# Patient Record
Sex: MALE | Race: WHITE | NOT HISPANIC OR LATINO | Employment: FULL TIME | URBAN - METROPOLITAN AREA
[De-identification: names, ages, dates, MRNs, and addresses within clinical notes are randomized per-mention and may not be internally consistent; named-entity substitution may affect disease eponyms.]

---

## 2017-04-17 DIAGNOSIS — I10 ESSENTIAL HYPERTENSION: ICD-10-CM

## 2017-04-17 RX ORDER — AMLODIPINE BESYLATE AND BENAZEPRIL HYDROCHLORIDE 5; 10 MG/1; MG/1
1 CAPSULE ORAL DAILY
Qty: 30 CAPSULE | Refills: 0 | Status: SHIPPED | OUTPATIENT
Start: 2017-04-17 | End: 2017-07-21 | Stop reason: SDUPTHER

## 2017-06-21 ENCOUNTER — OFFICE VISIT (OUTPATIENT)
Dept: INTERNAL MEDICINE | Age: 32
End: 2017-06-21

## 2017-06-21 VITALS
HEART RATE: 72 BPM | WEIGHT: 298 LBS | HEIGHT: 69 IN | BODY MASS INDEX: 44.14 KG/M2 | DIASTOLIC BLOOD PRESSURE: 80 MMHG | RESPIRATION RATE: 12 BRPM | SYSTOLIC BLOOD PRESSURE: 130 MMHG

## 2017-06-21 DIAGNOSIS — I10 ESSENTIAL HYPERTENSION: Chronic | ICD-10-CM

## 2017-06-21 DIAGNOSIS — R21 RASH: Primary | ICD-10-CM

## 2017-06-21 PROCEDURE — 99214 OFFICE O/P EST MOD 30 MIN: CPT | Performed by: INTERNAL MEDICINE

## 2017-06-21 NOTE — PROGRESS NOTES
"  Rene Goodman is a 31 y.o. male who presents with   Chief Complaint   Patient presents with   • Rash     Groin/lower abdominal rash.  Recently went to a local [facility] clinic and was diagnosed with \"jock itch\".  Was given Lotrimin powder which did not help.  He tried Lotrimin spray topically which has helped and he says now the problem is \"about gone\"   • Hypertension     Check blood pressure   .    Rash   This is a new problem. The current episode started in the past 7 days. The problem has been rapidly improving since onset. The affected locations include the abdomen and groin. The rash is characterized by itchiness and redness. He was exposed to nothing. Treatments tried: Lotrimin spray. The treatment provided significant relief.   Hypertension   This is a chronic problem. The current episode started more than 1 year ago. The problem is controlled. Past treatments include calcium channel blockers and ACE inhibitors. The current treatment provides moderate improvement. There are no compliance problems.         The following portions of the patient's history were reviewed and updated as appropriate: allergies, current medications, past medical history and problem list.    Review of Systems   Constitutional: Negative.    HENT: Negative.    Eyes: Negative.    Respiratory: Negative.    Cardiovascular: Negative.    Genitourinary: Negative.    Musculoskeletal: Negative.    Skin: Positive for rash.   Neurological: Negative.    Psychiatric/Behavioral: Negative.        Objective   Physical Exam   Constitutional: He is oriented to person, place, and time. He appears well-developed and well-nourished. No distress.   HENT:   Head: Normocephalic and atraumatic.   Eyes: Conjunctivae and EOM are normal. Pupils are equal, round, and reactive to light.   Neck: Normal range of motion. Neck supple. No thyromegaly present.   Neck exam negative.  Carotid auscultation normal-no bruits heard.   Cardiovascular: Normal rate, regular " "rhythm, normal heart sounds and intact distal pulses.  Exam reveals no gallop and no friction rub.    No murmur heard.  Pulmonary/Chest: Effort normal and breath sounds normal. No respiratory distress. He has no wheezes. He has no rales. He exhibits no tenderness.   Neurological: He is alert and oriented to person, place, and time.   Skin:   On today's visit there is very little to be seen in the way of any rash.  He has a few red bumps superior to his umbilicus but other than that there is no specific-appearing rash that can be seen in his groin or lower abdomen and by his own admission \"the rash is about gone anyway\" and he is feeling significantly improved by using the Lotrimin spray.   Psychiatric: He has a normal mood and affect. His behavior is normal. Judgment and thought content normal.   Nursing note and vitals reviewed.      Assessment/Plan   Rene was seen today for rash and hypertension.    Diagnoses and all orders for this visit:    Rash    Essential hypertension      Plan: I have suggested that he continue using the Lotrimin spray for another week and then discontinue using it.    Hypertension: Blood pressure under good control with current treatment.  Continue the same.    His labs were reviewed from October and basically were within acceptable range.  He did not follow through with a six-month follow-up visit because he said there was some job change involved and he had to go out of town.  He was advised a follow-up checkup/lab update visit this coming October         "

## 2017-07-21 ENCOUNTER — TELEPHONE (OUTPATIENT)
Dept: INTERNAL MEDICINE | Age: 32
End: 2017-07-21

## 2017-07-21 DIAGNOSIS — I10 ESSENTIAL HYPERTENSION: ICD-10-CM

## 2017-07-21 RX ORDER — AMLODIPINE BESYLATE AND BENAZEPRIL HYDROCHLORIDE 5; 10 MG/1; MG/1
1 CAPSULE ORAL DAILY
Qty: 30 CAPSULE | Refills: 3 | Status: SHIPPED | OUTPATIENT
Start: 2017-07-21 | End: 2017-12-02 | Stop reason: SDUPTHER

## 2017-12-02 DIAGNOSIS — I10 ESSENTIAL HYPERTENSION: ICD-10-CM

## 2017-12-04 RX ORDER — AMLODIPINE BESYLATE AND BENAZEPRIL HYDROCHLORIDE 5; 10 MG/1; MG/1
1 CAPSULE ORAL DAILY
Qty: 30 CAPSULE | Refills: 0 | Status: SHIPPED | OUTPATIENT
Start: 2017-12-04 | End: 2018-09-10 | Stop reason: SDDI

## 2018-09-10 ENCOUNTER — OFFICE VISIT (OUTPATIENT)
Dept: INTERNAL MEDICINE | Age: 33
End: 2018-09-10

## 2018-09-10 VITALS
RESPIRATION RATE: 13 BRPM | WEIGHT: 310 LBS | SYSTOLIC BLOOD PRESSURE: 130 MMHG | TEMPERATURE: 96.6 F | HEART RATE: 105 BPM | DIASTOLIC BLOOD PRESSURE: 108 MMHG | OXYGEN SATURATION: 100 % | HEIGHT: 69 IN | BODY MASS INDEX: 45.91 KG/M2

## 2018-09-10 DIAGNOSIS — E78.2 MIXED HYPERLIPIDEMIA: ICD-10-CM

## 2018-09-10 DIAGNOSIS — E66.01 MORBID OBESITY WITH BODY MASS INDEX (BMI) OF 45.0 TO 49.9 IN ADULT (HCC): ICD-10-CM

## 2018-09-10 DIAGNOSIS — I10 ESSENTIAL HYPERTENSION: Chronic | ICD-10-CM

## 2018-09-10 DIAGNOSIS — Z00.00 ANNUAL PHYSICAL EXAM: Primary | ICD-10-CM

## 2018-09-10 LAB
ALBUMIN SERPL-MCNC: 5.3 G/DL (ref 3.5–5.2)
ALBUMIN/GLOB SERPL: 1.9 G/DL
ALP SERPL-CCNC: 94 U/L (ref 39–117)
ALT SERPL-CCNC: 53 U/L (ref 1–41)
AST SERPL-CCNC: 29 U/L (ref 1–40)
BILIRUB SERPL-MCNC: 0.7 MG/DL (ref 0.1–1.2)
BUN SERPL-MCNC: 11 MG/DL (ref 6–20)
BUN/CREAT SERPL: 10.4 (ref 7–25)
CALCIUM SERPL-MCNC: 10.5 MG/DL (ref 8.6–10.5)
CHLORIDE SERPL-SCNC: 101 MMOL/L (ref 98–107)
CHOLEST SERPL-MCNC: 197 MG/DL (ref 0–200)
CO2 SERPL-SCNC: 30.3 MMOL/L (ref 22–29)
CREAT SERPL-MCNC: 1.06 MG/DL (ref 0.76–1.27)
GLOBULIN SER CALC-MCNC: 2.8 GM/DL
GLUCOSE SERPL-MCNC: 93 MG/DL (ref 65–99)
HBA1C MFR BLD: 5.51 % (ref 4.8–5.6)
HDLC SERPL-MCNC: 42 MG/DL (ref 40–60)
LDLC SERPL CALC-MCNC: 136 MG/DL (ref 0–100)
POTASSIUM SERPL-SCNC: 4.1 MMOL/L (ref 3.5–5.2)
PROT SERPL-MCNC: 8.1 G/DL (ref 6–8.5)
SODIUM SERPL-SCNC: 146 MMOL/L (ref 136–145)
T4 FREE SERPL-MCNC: 1.39 NG/DL (ref 0.93–1.7)
TRIGL SERPL-MCNC: 94 MG/DL (ref 0–150)
TSH SERPL DL<=0.005 MIU/L-ACNC: 2.2 MIU/ML (ref 0.27–4.2)
VLDLC SERPL CALC-MCNC: 18.8 MG/DL (ref 5–40)

## 2018-09-10 PROCEDURE — 99395 PREV VISIT EST AGE 18-39: CPT | Performed by: INTERNAL MEDICINE

## 2018-09-10 RX ORDER — AMLODIPINE BESYLATE AND BENAZEPRIL HYDROCHLORIDE 5; 10 MG/1; MG/1
1 CAPSULE ORAL DAILY
Qty: 90 CAPSULE | Refills: 3 | Status: SHIPPED | OUTPATIENT
Start: 2018-09-10 | End: 2019-09-27 | Stop reason: SDUPTHER

## 2018-09-10 NOTE — PROGRESS NOTES
Rene Goodman is a 33 y.o. male who presents with   Chief Complaint   Patient presents with   • Annual Exam   • Hypertension     Patient has been on Lotrel 5-10 every morning but admits that he has not been on any of this since May.   • Hyperlipidemia     Currently on no lipid lowering therapy.   • Morbid obesity     Weight 310 pounds.  BMI 45.8   .    33-year-old morbidly obese male presents for a physical with lab updates.  History is as outlined above.  Also he notes some problems with snoring and fatigue in the mornings although he says he only gets 4 or 5 hours of sleep at night.      Hypertension   This is a chronic problem. The current episode started more than 1 year ago. The problem is uncontrolled. Past treatments include ACE inhibitors and calcium channel blockers. Current antihypertension treatment includes nothing. The current treatment provides no improvement. Compliance problems include diet and exercise.    Hyperlipidemia   This is a chronic problem. The current episode started more than 1 year ago. The problem is controlled. Recent lipid tests were reviewed and are normal.      Morbid obesity: History as outlined above.  Patient reports that he does not have time to go to the gym for a work out due to the fact of his work obligations and home obligations with his children and his wife.    The following portions of the patient's history were reviewed and updated as appropriate: allergies, current medications, past family history, past medical history, past social history, past surgical history and problem list.    Review of Systems   Constitutional: Negative.    HENT: Negative.    Eyes: Negative.    Respiratory: Negative.    Cardiovascular: Negative.    Genitourinary: Negative.    Musculoskeletal: Negative.    Skin: Negative.    Neurological: Negative.    Psychiatric/Behavioral: Negative.        Objective   Physical Exam   Constitutional: He is oriented to person, place, and time. He appears  well-developed and well-nourished. No distress.   33-year-old morbidly obese male as noted.  Current weight is 310 pounds.  Goal weight at his height of 5 foot 9 is 175 pounds but a more realistic goal weight for him is somewhere around 200 pounds.   HENT:   Head: Normocephalic and atraumatic.   Right Ear: External ear normal.   Left Ear: External ear normal.   Nose: Nose normal.   Mouth/Throat: Oropharynx is clear and moist. No oropharyngeal exudate.   Eyes: Pupils are equal, round, and reactive to light. Conjunctivae and EOM are normal. Right eye exhibits no discharge. Left eye exhibits no discharge. No scleral icterus.   Neck: Normal range of motion. Neck supple. No JVD present. No tracheal deviation present. No thyromegaly present.   Neck exam negative.  Carotid auscultation normal-no bruits heard.   Cardiovascular: Normal rate, regular rhythm, normal heart sounds and intact distal pulses.  Exam reveals no gallop and no friction rub.    No murmur heard.  Pulmonary/Chest: Effort normal and breath sounds normal. No respiratory distress. He has no wheezes. He has no rales. He exhibits no tenderness.   Abdominal: Soft. Bowel sounds are normal. He exhibits no distension and no mass. There is no tenderness. No hernia.   Genitourinary:   Genitourinary Comments: Deferred.   Musculoskeletal: Normal range of motion. He exhibits no edema, tenderness or deformity.   Lymphadenopathy:     He has no cervical adenopathy.   Neurological: He is alert and oriented to person, place, and time. He has normal reflexes. He displays normal reflexes. No cranial nerve deficit. He exhibits normal muscle tone. Coordination normal.   Skin: Skin is warm and dry. No rash noted. He is not diaphoretic. No erythema. No pallor.   Tattoos present-left deltoid area-cross with Akhiok fillers; right deltoid area Lion head with the words Forte,Dominick,Aduivat.  Also numerous darkly pigmented nevi on his left cheek (3 mm) upper left back (5 mm) and  several other smaller scattered darkly pigmented nevi.  He does have a dermatologist and will be seeing a dermatologist for follow-up of this he says.   Psychiatric: He has a normal mood and affect. His behavior is normal. Judgment and thought content normal.   Nursing note and vitals reviewed.      Assessment/Plan   Rene was seen today for annual exam, hypertension, hyperlipidemia and morbid obesity.    Diagnoses and all orders for this visit:    Annual physical exam  -     Comprehensive Metabolic Panel  -     Hemoglobin A1c  -     Lipid Panel  -     TSH+Free T4    Essential hypertension  -     amLODIPine-benazepril (LOTREL 5-10) 5-10 MG per capsule; Take 1 capsule by mouth Daily.  -     Comprehensive Metabolic Panel  -     Hemoglobin A1c  -     TSH+Free T4    Mixed hyperlipidemia  -     Comprehensive Metabolic Panel  -     Hemoglobin A1c  -     Lipid Panel  -     TSH+Free T4    Morbid obesity with body mass index (BMI) of 45.0 to 49.9 in adult (CMS/[facility])  -     Comprehensive Metabolic Panel  -     Hemoglobin A1c  -     Lipid Panel  -     TSH+Free T4        Plans:  #1 hypertension resume Lotrel 5-10-1 daily.  Recheck blood pressure in 4 weeks.    #2 morbid obesity: Weight watchers advised.  Daily exercise advised.  Patient advised that he needs to make time for himself to improve his own health.    #3 snoring/fatigue in the morning: Possible sleep apnea?.  Workup advised patient wants to think about it and discuss it with his wife who is a nurse practitioner.    #4 pigmented nevi as described above: Patient will call and make his old appointment for dermatology evaluation.    Long-term plans tentatively we'll be a one-year follow-up annual physical with lab updates but this will be pending what his blood pressure does.

## 2018-09-11 NOTE — PROGRESS NOTES
All labs are within acceptable range.  Follow-up blood pressure check in 4 weeks as advised.  A follow-up physical with lab updates in one year is also advised.

## 2018-10-08 ENCOUNTER — OFFICE VISIT (OUTPATIENT)
Dept: INTERNAL MEDICINE | Age: 33
End: 2018-10-08

## 2018-10-08 VITALS
OXYGEN SATURATION: 99 % | SYSTOLIC BLOOD PRESSURE: 128 MMHG | WEIGHT: 305 LBS | HEART RATE: 96 BPM | TEMPERATURE: 97.5 F | HEIGHT: 69 IN | BODY MASS INDEX: 45.18 KG/M2 | RESPIRATION RATE: 13 BRPM | DIASTOLIC BLOOD PRESSURE: 90 MMHG

## 2018-10-08 DIAGNOSIS — Z23 NEED FOR INFLUENZA VACCINATION: Primary | ICD-10-CM

## 2018-10-08 DIAGNOSIS — I10 ESSENTIAL HYPERTENSION: Chronic | ICD-10-CM

## 2018-10-08 PROCEDURE — 99213 OFFICE O/P EST LOW 20 MIN: CPT | Performed by: INTERNAL MEDICINE

## 2018-10-08 PROCEDURE — 90471 IMMUNIZATION ADMIN: CPT | Performed by: INTERNAL MEDICINE

## 2018-10-08 PROCEDURE — 90674 CCIIV4 VAC NO PRSV 0.5 ML IM: CPT | Performed by: INTERNAL MEDICINE

## 2018-10-08 NOTE — PROGRESS NOTES
Rene Goodman is a 33 y.o. male who presents with   Chief Complaint   Patient presents with   • Hypertension     Follow-up blood pressure check.  It was 130/108 on September 10 when he was here for his physical.  His weight has decreased from 310 pounds then to 305 pounds.  His goal weight at 5 foot 9 is approximately 175 pounds.  He is not monitoring his blood pressure at home although his wife is a cardiac nurse he says.  He has had a lot of personal issues which has prevented him from monitoring his own blood pressure he says.   .    33-year-old male presents as per history above.  No complaints on today's visit.      Hypertension   This is a chronic problem. The current episode started more than 1 year ago. The problem is controlled. Current antihypertension treatment includes calcium channel blockers and ACE inhibitors. The current treatment provides mild improvement. Compliance problems include diet and exercise.         The following portions of the patient's history were reviewed and updated as appropriate: allergies, current medications and problem list.    Review of Systems   Constitutional: Negative.    Respiratory: Negative.    Cardiovascular: Negative.    Neurological: Negative.    Psychiatric/Behavioral: Negative.        Objective   Physical Exam   Constitutional: He is oriented to person, place, and time. He appears well-developed and well-nourished.   HENT:   Head: Normocephalic and atraumatic.   Eyes: Pupils are equal, round, and reactive to light. EOM are normal.   Cardiovascular: Normal rate.    Pulmonary/Chest: Effort normal.   Neurological: He is alert and oriented to person, place, and time.   Psychiatric: He has a normal mood and affect. His behavior is normal. Judgment and thought content normal.   Nursing note and vitals reviewed.      Assessment/Plan   Rene was seen today for hypertension.    Diagnoses and all orders for this visit:    Need for influenza vaccination  -     Flucelvax  Quad=>4Years (8215-1872)    Essential hypertension    Plan: Continue weight loss as a major modality to help lower his blood pressure.  Continue current antihypertensive therapy as prescribed.  His recent labs from his physical were reviewed and I see nothing that would need attention at this time from that standpoint.    Follow-up blood pressure recheck in December    Flu shot administered as above

## 2018-12-10 ENCOUNTER — OFFICE VISIT (OUTPATIENT)
Dept: INTERNAL MEDICINE | Age: 33
End: 2018-12-10

## 2018-12-10 VITALS
DIASTOLIC BLOOD PRESSURE: 90 MMHG | BODY MASS INDEX: 46.36 KG/M2 | OXYGEN SATURATION: 99 % | WEIGHT: 313 LBS | SYSTOLIC BLOOD PRESSURE: 138 MMHG | TEMPERATURE: 97 F | HEIGHT: 69 IN | HEART RATE: 94 BPM | RESPIRATION RATE: 13 BRPM

## 2018-12-10 DIAGNOSIS — E78.2 MIXED HYPERLIPIDEMIA: ICD-10-CM

## 2018-12-10 DIAGNOSIS — E66.01 MORBID OBESITY WITH BODY MASS INDEX (BMI) OF 45.0 TO 49.9 IN ADULT (HCC): ICD-10-CM

## 2018-12-10 DIAGNOSIS — I10 ESSENTIAL HYPERTENSION: Primary | Chronic | ICD-10-CM

## 2018-12-10 PROCEDURE — 99214 OFFICE O/P EST MOD 30 MIN: CPT | Performed by: INTERNAL MEDICINE

## 2018-12-10 NOTE — PROGRESS NOTES
"  Rene Goodman is a 33 y.o. male who presents with   Chief Complaint   Patient presents with   • Hypertension   • Hyperlipidemia   • Morbid obesity   .    33-year-old morbidly obese male presents for follow-up blood pressure check and discussion of his obesity and elevation of his LDL.  It is noted that his weight has increased from 305 pounds in October to his current weight of 313 pounds.  He attributes all of that to \"eating wrong during the holidays\".  He realizes that weight loss is a necessity to help in lowering blood pressure and lipid values.  At his height of 5 foot 9 and his goal weight is about 175 pounds however a more realistic weight for him would probably be somewhere around 200 pounds eventually.      Hypertension   This is a chronic problem. The current episode started more than 1 year ago. The problem has been waxing and waning since onset. The problem is controlled. Current antihypertension treatment includes calcium channel blockers and ACE inhibitors. The current treatment provides mild improvement. Compliance problems include diet and exercise.    Hyperlipidemia   This is a chronic problem. The current episode started more than 1 year ago. The problem is controlled. Lipid results: Mild elevation of LDL but rest of the lipid levels are normal. He is currently on no antihyperlipidemic treatment. Compliance problems include adherence to diet and adherence to exercise.         The following portions of the patient's history were reviewed and updated as appropriate: allergies, current medications, past medical history and problem list.    Review of Systems   Constitutional: Negative.    HENT: Negative.    Eyes: Negative.    Respiratory: Negative.    Cardiovascular: Negative.    Genitourinary: Negative.    Musculoskeletal: Negative.    Skin: Negative.    Neurological: Negative.    Psychiatric/Behavioral: Negative.        Objective   Physical Exam   Constitutional: He is oriented to person, place, " and time. He appears well-developed and well-nourished. No distress.   HENT:   Head: Normocephalic and atraumatic.   Eyes: Conjunctivae and EOM are normal. Pupils are equal, round, and reactive to light.   Neck: Normal range of motion. Neck supple. No thyromegaly present.   Neck exam negative.  Carotid auscultation normal-no bruits heard.   Cardiovascular: Normal rate, regular rhythm, normal heart sounds and intact distal pulses. Exam reveals no gallop and no friction rub.   No murmur heard.  Pulmonary/Chest: Effort normal and breath sounds normal. No respiratory distress. He has no wheezes. He has no rales. He exhibits no tenderness.   Neurological: He is alert and oriented to person, place, and time.   Psychiatric: He has a normal mood and affect. His behavior is normal. Judgment and thought content normal.   Nursing note and vitals reviewed.      Assessment/Plan   Rene was seen today for hypertension, hyperlipidemia and morbid obesity.    Diagnoses and all orders for this visit:    Essential hypertension    Mixed hyperlipidemia    Morbid obesity with body mass index (BMI) of 45.0 to 49.9 in adult (CMS/[facility])    Plan: For now he will continue his current medication as prescribed for his blood pressure.  Again weight loss was stressed and advised as a necessity to help lower his blood pressure and to keep his lipid levels within ranges of acceptability.    We will see him in 6 months for a reevaluation of his blood pressure, weight and his lipid levels.

## 2019-08-15 ENCOUNTER — OFFICE VISIT (OUTPATIENT)
Dept: INTERNAL MEDICINE | Age: 34
End: 2019-08-15

## 2019-08-15 VITALS
SYSTOLIC BLOOD PRESSURE: 148 MMHG | WEIGHT: 279.8 LBS | RESPIRATION RATE: 13 BRPM | TEMPERATURE: 97.6 F | BODY MASS INDEX: 41.44 KG/M2 | HEART RATE: 68 BPM | DIASTOLIC BLOOD PRESSURE: 100 MMHG | OXYGEN SATURATION: 100 % | HEIGHT: 69 IN

## 2019-08-15 DIAGNOSIS — I10 ESSENTIAL HYPERTENSION: Primary | Chronic | ICD-10-CM

## 2019-08-15 DIAGNOSIS — E78.2 MIXED HYPERLIPIDEMIA: ICD-10-CM

## 2019-08-15 DIAGNOSIS — R73.01 IMPAIRED FASTING GLUCOSE: Chronic | ICD-10-CM

## 2019-08-15 LAB
ALBUMIN SERPL-MCNC: 4.8 G/DL (ref 3.5–5.2)
ALBUMIN/GLOB SERPL: 1.8 G/DL
ALP SERPL-CCNC: 94 U/L (ref 39–117)
ALT SERPL-CCNC: 31 U/L (ref 1–41)
AST SERPL-CCNC: 20 U/L (ref 1–40)
BILIRUB SERPL-MCNC: 0.9 MG/DL (ref 0.2–1.2)
BUN SERPL-MCNC: 13 MG/DL (ref 6–20)
BUN/CREAT SERPL: 13.7 (ref 7–25)
CALCIUM SERPL-MCNC: 9.7 MG/DL (ref 8.6–10.5)
CHLORIDE SERPL-SCNC: 99 MMOL/L (ref 98–107)
CHOLEST SERPL-MCNC: 165 MG/DL (ref 0–200)
CO2 SERPL-SCNC: 27.8 MMOL/L (ref 22–29)
CREAT SERPL-MCNC: 0.95 MG/DL (ref 0.76–1.27)
GLOBULIN SER CALC-MCNC: 2.6 GM/DL
GLUCOSE SERPL-MCNC: 103 MG/DL (ref 65–99)
HBA1C MFR BLD: 5.6 % (ref 4.8–5.6)
HDLC SERPL-MCNC: 37 MG/DL (ref 40–60)
LDLC SERPL CALC-MCNC: 112 MG/DL (ref 0–100)
POTASSIUM SERPL-SCNC: 4.1 MMOL/L (ref 3.5–5.2)
PROT SERPL-MCNC: 7.4 G/DL (ref 6–8.5)
SODIUM SERPL-SCNC: 139 MMOL/L (ref 136–145)
T4 FREE SERPL-MCNC: 1.22 NG/DL (ref 0.93–1.7)
TRIGL SERPL-MCNC: 80 MG/DL (ref 0–150)
TSH SERPL DL<=0.005 MIU/L-ACNC: 2 MIU/ML (ref 0.27–4.2)
VLDLC SERPL CALC-MCNC: 16 MG/DL

## 2019-08-15 PROCEDURE — 99214 OFFICE O/P EST MOD 30 MIN: CPT | Performed by: INTERNAL MEDICINE

## 2019-08-15 RX ORDER — HYDROCHLOROTHIAZIDE 12.5 MG/1
TABLET ORAL
Qty: 30 TABLET | Refills: 5 | Status: SHIPPED | OUTPATIENT
Start: 2019-08-15 | End: 2020-01-30 | Stop reason: SDUPTHER

## 2019-08-15 NOTE — PROGRESS NOTES
Rene Goodman is a 34 y.o. male who presents with   Chief Complaint   Patient presents with   • Hypertension     Review of his blood sugar shows a progressive rise from October 8 when he was on 128/92 his current blood pressure today of 148/100.  This in spite of the fact that his weight has decreased from 313 pounds in December to his current weight of 279 pounds.  His goal weight at 5 foot 9 is approximately 175 pounds.  Current blood pressure medication is amlodipine benazepril 5-10 daily   • Hyperlipidemia     No lipid-lowering therapy   • Blood Sugar Problem     No prescription medication for sugar   .    34-year-old male.  Blood pressure checkup.  It is noted that when he was seen in September his blood pressure was mildly elevated then.  He was advised then to monitor it at home and return in 1 month for a repeat blood pressure check and reevaluation.  It is also noted that he did not comply with that advice and admits today that he is not monitoring his blood pressure at home.      Hypertension   This is a chronic problem. The current episode started today. The problem has been gradually worsening since onset. The problem is uncontrolled. Past treatments include calcium channel blockers and ACE inhibitors. Current antihypertension treatment includes ACE inhibitors and calcium channel blockers. The current treatment provides no improvement.   Hyperlipidemia   This is a chronic problem. The current episode started more than 1 year ago. The problem is controlled. Exacerbating diseases include diabetes. He is currently on no antihyperlipidemic treatment.   Blood Sugar Problem   This is a chronic problem. The current episode started more than 1 year ago. The problem has been waxing and waning. He has tried nothing for the symptoms.        The following portions of the patient's history were reviewed and updated as appropriate: allergies, current medications, past medical history and problem list.    Review of  Systems   Constitutional: Negative.    HENT: Negative.    Eyes: Negative.    Respiratory: Negative.    Cardiovascular: Negative.    Genitourinary: Negative.    Musculoskeletal: Negative.    Skin: Negative.    Neurological: Negative.    Psychiatric/Behavioral: Negative.        Objective   Physical Exam   Constitutional: He is oriented to person, place, and time. He appears well-developed and well-nourished. No distress.   HENT:   Head: Normocephalic and atraumatic.   Eyes: Conjunctivae and EOM are normal. Pupils are equal, round, and reactive to light.   Neck: Normal range of motion. Neck supple. No thyromegaly present.   Neck exam negative.  Carotid auscultation normal-no bruits heard.   Cardiovascular: Normal rate, regular rhythm, normal heart sounds and intact distal pulses. Exam reveals no gallop and no friction rub.   No murmur heard.  Pulmonary/Chest: Effort normal and breath sounds normal. No respiratory distress. He has no wheezes. He has no rales. He exhibits no tenderness.   Neurological: He is alert and oriented to person, place, and time.   Psychiatric: He has a normal mood and affect. His behavior is normal. Judgment and thought content normal.   Nursing note and vitals reviewed.      Assessment/Plan   Rene was seen today for hypertension, hyperlipidemia and blood sugar problem.    Diagnoses and all orders for this visit:    Essential hypertension  -     hydrochlorothiazide (HYDRODIURIL) 12.5 MG tablet; Take 1 tablet every morning before breakfast for elevated blood pressure  -     Comprehensive Metabolic Panel  -     TSH+Free T4    Mixed hyperlipidemia  -     Comprehensive Metabolic Panel  -     Lipid Panel  -     TSH+Free T4    Impaired fasting glucose  -     Comprehensive Metabolic Panel  -     TSH+Free T4  -     Hemoglobin A1c        Plan: Labs as above for the issues as outlined.  We will add hydrochlorothiazide 12.5 mg daily to current treatment regimen.  Blood pressure recheck in 4 weeks  advised.    Assuming today's labs are acceptable we will plan on a general 6-month checkup/lab update visit sometime in February or thereabouts

## 2019-08-16 NOTE — PROGRESS NOTES
Blood sugar mildly elevated at 103 but this is not enough to worry about given that the A1c is normal at 5.6.  Continued weight loss, minimizing sweets, minimizing carbohydrates all advised.  The rest of the labs including the liver tests, kidney tests, lipid panel and thyroid profile are all within normal range.  Continue current treatment as prescribed.  Follow-up blood pressure check in 4 weeks advised as discussed.  General 6-month checkup with lab updates advised for 6 months.

## 2019-09-19 ENCOUNTER — OFFICE VISIT (OUTPATIENT)
Dept: INTERNAL MEDICINE | Age: 34
End: 2019-09-19

## 2019-09-19 VITALS
OXYGEN SATURATION: 100 % | HEART RATE: 76 BPM | RESPIRATION RATE: 13 BRPM | DIASTOLIC BLOOD PRESSURE: 88 MMHG | SYSTOLIC BLOOD PRESSURE: 138 MMHG | WEIGHT: 275 LBS | HEIGHT: 69 IN | BODY MASS INDEX: 40.73 KG/M2 | TEMPERATURE: 97.8 F

## 2019-09-19 DIAGNOSIS — I10 ESSENTIAL HYPERTENSION: Primary | Chronic | ICD-10-CM

## 2019-09-19 PROCEDURE — 99214 OFFICE O/P EST MOD 30 MIN: CPT | Performed by: INTERNAL MEDICINE

## 2019-09-26 ENCOUNTER — TELEPHONE (OUTPATIENT)
Dept: INTERNAL MEDICINE | Age: 34
End: 2019-09-26

## 2019-09-26 DIAGNOSIS — I10 ESSENTIAL HYPERTENSION: Chronic | ICD-10-CM

## 2019-09-26 NOTE — TELEPHONE ENCOUNTER
Melanie/Pharm Tech [email or web address] is requesting a 90-day rx refill of amlodipine-benazepril 5-10mg on behalf of the pt.    [email or web address] #[phone], Reference # [id number].

## 2019-09-27 RX ORDER — AMLODIPINE BESYLATE AND BENAZEPRIL HYDROCHLORIDE 5; 10 MG/1; MG/1
1 CAPSULE ORAL DAILY
Qty: 90 CAPSULE | Refills: 1 | Status: SHIPPED | OUTPATIENT
Start: 2019-09-27 | End: 2020-11-03 | Stop reason: SDUPTHER

## 2020-01-02 NOTE — TELEPHONE ENCOUNTER
[facility] is where the pt wants all refills to go through from here on out. I took the Mikayla out of his chart so it should not be a problem from here on out.

## 2020-01-30 ENCOUNTER — TELEPHONE (OUTPATIENT)
Dept: INTERNAL MEDICINE | Age: 35
End: 2020-01-30

## 2020-01-30 DIAGNOSIS — I10 ESSENTIAL HYPERTENSION: Chronic | ICD-10-CM

## 2020-01-30 RX ORDER — HYDROCHLOROTHIAZIDE 12.5 MG/1
TABLET ORAL
Qty: 90 TABLET | Refills: 3 | Status: SHIPPED | OUTPATIENT
Start: 2020-01-30 | End: 2020-02-25 | Stop reason: SDUPTHER

## 2020-01-30 NOTE — TELEPHONE ENCOUNTER
Pts wife called requesting his prescription for hydrochlorothiazide (HYDRODIURIL) 12.5 MG tablet   Be sent over to [facility], with a 90 day. Please advise.

## 2020-02-25 ENCOUNTER — TELEPHONE (OUTPATIENT)
Dept: INTERNAL MEDICINE | Age: 35
End: 2020-02-25

## 2020-02-25 DIAGNOSIS — I10 ESSENTIAL HYPERTENSION: Chronic | ICD-10-CM

## 2020-02-25 RX ORDER — HYDROCHLOROTHIAZIDE 12.5 MG/1
TABLET ORAL
Qty: 90 TABLET | Refills: 3 | Status: SHIPPED | OUTPATIENT
Start: 2020-02-25

## 2020-02-25 NOTE — TELEPHONE ENCOUNTER
This medication was refilled as requested but sent to mail order on record ([facility])  Med will be sent AGAIN  To updated pharm per this request. SEBASTIAN

## 2020-02-25 NOTE — TELEPHONE ENCOUNTER
ADIEL FROM [facility] CALLED TO REQUEST A PRESCRIPTION RENEWAL FOR THE PATIENT, BRAYAN GUPTA, FOR THE HYDROCHLOROTHIAZIDE (HYDRODIURIL) 12.5 MG TABLET.    ADIEL STATED THAT THE PATIENT REQUESTED FOR THIS PRESCRIPTION TO BE RENEWED ONLINE LAST FRIDAY (2/21/20).    IF THERE ARE ANY QUESTIONS OR CONCERNS PLEASE CALL ADIEL FROM [facility] [phone] USING REFERENCE #[id number].

## 2020-06-01 ENCOUNTER — OFFICE VISIT (OUTPATIENT)
Dept: INTERNAL MEDICINE | Age: 35
End: 2020-06-01

## 2020-06-01 VITALS
BODY MASS INDEX: 42.21 KG/M2 | DIASTOLIC BLOOD PRESSURE: 100 MMHG | HEIGHT: 69 IN | OXYGEN SATURATION: 100 % | TEMPERATURE: 97.7 F | WEIGHT: 285 LBS | RESPIRATION RATE: 13 BRPM | SYSTOLIC BLOOD PRESSURE: 150 MMHG | HEART RATE: 102 BPM

## 2020-06-01 DIAGNOSIS — I10 ESSENTIAL HYPERTENSION: Primary | Chronic | ICD-10-CM

## 2020-06-01 DIAGNOSIS — R73.01 IMPAIRED FASTING GLUCOSE: Chronic | ICD-10-CM

## 2020-06-01 DIAGNOSIS — E78.2 MIXED HYPERLIPIDEMIA: ICD-10-CM

## 2020-06-01 LAB
ALBUMIN SERPL-MCNC: 5 G/DL (ref 3.5–5.2)
ALBUMIN/GLOB SERPL: 1.7 G/DL
ALP SERPL-CCNC: 79 U/L (ref 39–117)
ALT SERPL-CCNC: 42 U/L (ref 1–41)
AST SERPL-CCNC: 21 U/L (ref 1–40)
BILIRUB SERPL-MCNC: 0.6 MG/DL (ref 0.2–1.2)
BUN SERPL-MCNC: 16 MG/DL (ref 6–20)
BUN/CREAT SERPL: 14.3 (ref 7–25)
CALCIUM SERPL-MCNC: 10.3 MG/DL (ref 8.6–10.5)
CHLORIDE SERPL-SCNC: 100 MMOL/L (ref 98–107)
CHOLEST SERPL-MCNC: 188 MG/DL (ref 0–200)
CO2 SERPL-SCNC: 29 MMOL/L (ref 22–29)
CREAT SERPL-MCNC: 1.12 MG/DL (ref 0.76–1.27)
GLOBULIN SER CALC-MCNC: 2.9 GM/DL
GLUCOSE SERPL-MCNC: 96 MG/DL (ref 65–99)
HBA1C MFR BLD: 5.5 % (ref 4.8–5.6)
HDLC SERPL-MCNC: 48 MG/DL (ref 40–60)
LDLC SERPL CALC-MCNC: 122 MG/DL (ref 0–100)
POTASSIUM SERPL-SCNC: 4.7 MMOL/L (ref 3.5–5.2)
PROT SERPL-MCNC: 7.9 G/DL (ref 6–8.5)
SODIUM SERPL-SCNC: 138 MMOL/L (ref 136–145)
TRIGL SERPL-MCNC: 91 MG/DL (ref 0–150)
VLDLC SERPL CALC-MCNC: 18.2 MG/DL

## 2020-06-01 PROCEDURE — 99214 OFFICE O/P EST MOD 30 MIN: CPT | Performed by: INTERNAL MEDICINE

## 2020-06-01 RX ORDER — METOPROLOL SUCCINATE 100 MG/1
TABLET, EXTENDED RELEASE ORAL
Qty: 90 TABLET | Refills: 3 | Status: SHIPPED | OUTPATIENT
Start: 2020-06-01

## 2020-06-01 NOTE — PROGRESS NOTES
"Answers for HPI/ROS submitted by the patient on 5/31/2020   What is the primary reason for your visit?: Physical    Rene Goodman is a 34 y.o. male who presents with   Chief Complaint   Patient presents with   • Hypertension     Lotrel 5-10 daily; hydrochlorothiazide 12.5 mg daily.  Not monitoring blood pressure at home   • Hyperlipidemia     No prescription treatment for lipids   • Blood Sugar Problem     No prescription treatment for sugar.  Weight increased from 275 pounds in September to his current weight of 285 pounds.  Current BMI of 42.1.  Height 5 feet 10.  Estimated goal weight approximately 185 pounds.   .    34-year-old male.  Blood pressure check.  6-month checkup/lab update visit.  No complaints.    Hypertension   This is a chronic problem. The current episode started more than 1 year ago. The problem has been waxing and waning since onset. The problem is controlled. Current antihypertension treatment includes calcium channel blockers, ACE inhibitors and diuretics. The current treatment provides no improvement. Compliance problems include diet and exercise.    Hyperlipidemia   This is a chronic problem. The current episode started more than 1 year ago. The problem is controlled. Exacerbating diseases include diabetes. He is currently on no antihyperlipidemic treatment. Compliance problems include adherence to diet and adherence to exercise.    Blood Sugar Problem   This is a chronic problem. The current episode started more than 1 year ago. The problem has been waxing and waning. He has tried nothing for the symptoms.        /100   Pulse 102   Temp 97.7 °F (36.5 °C)   Resp 13   Ht 175.3 cm (69.02\")   Wt 129 kg (285 lb)   SpO2 100%   BMI 42.07 kg/m²     The following portions of the patient's history were reviewed and updated as appropriate: allergies, current medications, past medical history and problem list.    Review of Systems   Constitutional: Negative.    HENT: Negative.    Eyes: " Negative.    Respiratory: Negative.    Cardiovascular: Negative.    Genitourinary: Negative.    Musculoskeletal: Negative.    Skin: Negative.    Neurological: Negative.    Psychiatric/Behavioral: Negative.        Objective   Physical Exam   Constitutional: He is oriented to person, place, and time. He appears well-developed and well-nourished. No distress.   HENT:   Head: Normocephalic and atraumatic.   Eyes: Pupils are equal, round, and reactive to light. Conjunctivae and EOM are normal.   Neck: Normal range of motion. Neck supple. No thyromegaly present.   Neck exam negative.  Carotid auscultation normal-no bruits heard.   Cardiovascular: Normal rate, regular rhythm, normal heart sounds and intact distal pulses. Exam reveals no gallop and no friction rub.   No murmur heard.  Pulmonary/Chest: Effort normal and breath sounds normal. No respiratory distress. He has no wheezes. He has no rales. He exhibits no tenderness.   Neurological: He is alert and oriented to person, place, and time.   Psychiatric: He has a normal mood and affect. His behavior is normal. Judgment and thought content normal.   Nursing note and vitals reviewed.      Assessment/Plan   Rene was seen today for hypertension, hyperlipidemia and blood sugar problem.    Diagnoses and all orders for this visit:    Essential hypertension  -     metoprolol succinate XL (TOPROL-XL) 100 MG 24 hr tablet; Take 1 every morning for elevated blood pressure  -     Comprehensive Metabolic Panel    Mixed hyperlipidemia  -     Comprehensive Metabolic Panel  -     Lipid Panel    Impaired fasting glucose  -     Comprehensive Metabolic Panel  -     Hemoglobin A1c      Plan: Labs as above for the issues as outlined.  Continue treatment as prescribed.    Add metoprolol XL succinate 100 mg daily.  Recheck blood pressure in 3 weeks after he begins the new treatment.

## 2020-07-06 ENCOUNTER — OFFICE VISIT (OUTPATIENT)
Dept: INTERNAL MEDICINE | Age: 35
End: 2020-07-06

## 2020-07-06 VITALS
DIASTOLIC BLOOD PRESSURE: 88 MMHG | TEMPERATURE: 98.6 F | OXYGEN SATURATION: 99 % | BODY MASS INDEX: 43.75 KG/M2 | HEIGHT: 69 IN | RESPIRATION RATE: 13 BRPM | SYSTOLIC BLOOD PRESSURE: 128 MMHG | WEIGHT: 295.4 LBS | HEART RATE: 87 BPM

## 2020-07-06 DIAGNOSIS — I10 ESSENTIAL HYPERTENSION: Primary | Chronic | ICD-10-CM

## 2020-07-06 PROCEDURE — 99214 OFFICE O/P EST MOD 30 MIN: CPT | Performed by: INTERNAL MEDICINE

## 2020-11-03 ENCOUNTER — TELEPHONE (OUTPATIENT)
Dept: INTERNAL MEDICINE | Age: 35
End: 2020-11-03

## 2020-11-03 DIAGNOSIS — I10 ESSENTIAL HYPERTENSION: Chronic | ICD-10-CM

## 2020-11-03 NOTE — TELEPHONE ENCOUNTER
Caller: Rex Rene    Relationship: Self    Best call back number: [phone]      Medication needed:   Requested Prescriptions     Pending Prescriptions Disp Refills   • amLODIPine-benazepril (LOTREL 5-10) 5-10 MG per capsule 90 capsule 1     Sig: Take 1 capsule by mouth Daily.       When do you need the refill by: AS SOON AS POSSIBLE    What details did the patient provide when requesting the medication: PATIENT HAS BEEN OUT FOR 3 WEEKS    Does the patient have less than a 3 day supply:  [x] Yes  [] No     What is the patient's preferred pharmacy: [facility] - [address] [phone] [facility] [phone] FX

## 2020-11-04 RX ORDER — AMLODIPINE BESYLATE AND BENAZEPRIL HYDROCHLORIDE 5; 10 MG/1; MG/1
1 CAPSULE ORAL DAILY
Qty: 90 CAPSULE | Refills: 2 | Status: SHIPPED | OUTPATIENT
Start: 2020-11-04

## 2020-12-21 ENCOUNTER — TELEPHONE (OUTPATIENT)
Dept: INTERNAL MEDICINE | Age: 35
End: 2020-12-21

## 2020-12-21 NOTE — TELEPHONE ENCOUNTER
LVM FOR PATIENT TO SCHEDULE APPT 1-25-21 WITH A NEW PROVIDER SINCE DR CUNNINGHAM WILL BE GONE. OK FOR HUB TO SCHEDULE

## 2023-06-10 DIAGNOSIS — I10 ESSENTIAL HYPERTENSION: Chronic | ICD-10-CM

## 2023-06-11 RX ORDER — AMLODIPINE BESYLATE AND BENAZEPRIL HYDROCHLORIDE 5; 10 MG/1; MG/1
CAPSULE ORAL
Qty: 90 CAPSULE | Refills: 1 | Status: SHIPPED | OUTPATIENT
Start: 2023-06-11

## 2023-09-21 DIAGNOSIS — I10 ESSENTIAL HYPERTENSION: Chronic | ICD-10-CM

## 2023-09-21 RX ORDER — AMLODIPINE BESYLATE AND BENAZEPRIL HYDROCHLORIDE 5; 10 MG/1; MG/1
1 CAPSULE ORAL
Qty: 90 CAPSULE | Refills: 0 | Status: SHIPPED | OUTPATIENT
Start: 2023-09-21

## 2025-07-09 ENCOUNTER — OFFICE VISIT (OUTPATIENT)
Dept: FAMILY MEDICINE CLINIC | Facility: CLINIC | Age: 40
End: 2025-07-09
Payer: COMMERCIAL

## 2025-07-09 VITALS
SYSTOLIC BLOOD PRESSURE: 164 MMHG | HEART RATE: 88 BPM | WEIGHT: 306.4 LBS | BODY MASS INDEX: 45.38 KG/M2 | OXYGEN SATURATION: 96 % | HEIGHT: 69 IN | DIASTOLIC BLOOD PRESSURE: 98 MMHG

## 2025-07-09 DIAGNOSIS — R06.83 SNORING: ICD-10-CM

## 2025-07-09 DIAGNOSIS — E66.01 MORBID OBESITY WITH BODY MASS INDEX (BMI) OF 45.0 TO 49.9 IN ADULT: ICD-10-CM

## 2025-07-09 DIAGNOSIS — I10 ESSENTIAL HYPERTENSION: Chronic | ICD-10-CM

## 2025-07-09 DIAGNOSIS — D22.9 ATYPICAL MOLE: ICD-10-CM

## 2025-07-09 DIAGNOSIS — Z00.00 GENERAL MEDICAL EXAM: Primary | ICD-10-CM

## 2025-07-09 PROCEDURE — 99395 PREV VISIT EST AGE 18-39: CPT | Performed by: FAMILY MEDICINE

## 2025-07-09 PROCEDURE — 99214 OFFICE O/P EST MOD 30 MIN: CPT | Performed by: FAMILY MEDICINE

## 2025-07-09 RX ORDER — AMLODIPINE AND BENAZEPRIL HYDROCHLORIDE 5; 10 MG/1; MG/1
1 CAPSULE ORAL DAILY
Qty: 90 CAPSULE | Refills: 1 | Status: SHIPPED | OUTPATIENT
Start: 2025-07-09

## 2025-07-09 NOTE — PROGRESS NOTES
Chief Complaint  Establish Care (BP concerns)    Subjective        Rene Goodman presents to [facility]       The patient is a 39-year-old male who presents to the clinic to establish care and discuss high blood pressure.    He has a family history of hypertension, with both grandparents and his mother having had the condition. He was previously under the care of Dr. Harris for his hypertension but has not seen a primary care physician in some time due to insurance issues. He has been managing his hypertension for approximately a decade, with amlodipine and benazepril effectively reducing his blood pressure to the 130s. He does not monitor his blood pressure at home.    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 He reports no chest pain, shortness of breath, nausea, vomiting, diarrhea, or constipation. He can ascend two flights of stairs without needing to stop due to shortness of breath. He also reports no palpitations or urinary issues such as frequency, urgency, or nocturia. He has no concerns regarding sexual health and declines STD testing. He has no current skin changes, lumps, or bumps. He had a skin reaction on his foot and ankle during a vacation, which was treated with steroids and levofloxacin at an urgent care center. He was previously on doxycycline twice for double ear infection and sinus infection and was still on it when the skin reaction occurred, prompting the switch to a stronger antibiotic. The skin reaction has since resolved.    He reports poor sleep quality and snoring but has not undergone a sleep study. He does not have difficulty falling asleep or staying asleep, but has experienced short-lived insomnia over the past week. He reports no stressors or issues with central air in his house. He uses a fan for cooling during sleep. He  "experiences fatigue in the late afternoon.    He has been attempting to lose weight for the past few months, reducing from 320 to 300 pounds. He has eliminated sugary sodas and night snacking, and packs his lunch daily. He avoids tomato-based foods due to acid reflux. His diet includes chicken, fish, shellfish, watermelon, peach, and hard-boiled eggs. He drinks diet coke at lunch and dinner and water afterwards. He also consumes coffee and liquid IV for electrolytes. He is not interested in medically assisted weight loss at this time.    He tries to exercise more on weekends than during the work week. He has a rower, spin bike, elliptical, and adjustable kettlebell at home for cardio workouts. He stays active around the house and considers his physically demanding job as exercise.    His ears have mostly cleared up, although they occasionally pop. He plans to see a dermatologist and uses sunscreen regularly.    SOCIAL HISTORY  He works in heavy industrial construction as an .    FAMILY HISTORY  Both maternal grandparents had high blood pressure. Mother had high blood pressure.    MEDICATIONS  Past: Amlodipine, benazepril, levofloxacin, doxycycline    History of Present Illness    Objective   Vital Signs:  /98   Pulse 88   Ht 175.3 cm (69\")   Wt (!) 139 kg (306 lb 6.4 oz)   SpO2 96%   BMI 45.25 kg/m²   Estimated body mass index is 45.25 kg/m² as calculated from the following:    Height as of this encounter: 175.3 cm (69\").    Weight as of this encounter: 139 kg (306 lb 6.4 oz).          Physical Exam  Constitutional:       Appearance: Normal appearance.   HENT:      Head: Normocephalic and atraumatic.      Nose: Nose normal.      Mouth/Throat:      Mouth: Mucous membranes are moist.   Eyes:      General: Lids are normal.      Conjunctiva/sclera: Conjunctivae normal.   Cardiovascular:      Rate and Rhythm: Normal rate and regular rhythm.      Heart sounds: Normal heart sounds.   Pulmonary:      " Effort: Pulmonary effort is normal.   Musculoskeletal:      Cervical back: Normal range of motion.      Right lower leg: No edema.      Left lower leg: No edema.   Skin:     General: Skin is warm and dry.   Neurological:      General: No focal deficit present.      Mental Status: He is alert and oriented to person, place, and time.      Gait: Gait is intact.   Psychiatric:         Mood and Affect: Mood normal.         Behavior: Behavior normal.         Thought Content: Thought content normal.        Result Review :               Results         Assessment and Plan        1. Hypertension.  His blood pressure has been elevated recently. He will be restarted on Lotrel (amlodipine and benazepril) as it was previously effective and well-tolerated. A prescription for Lotrel will be sent to [facility] pharmacy. Blood work including metabolic panel, cholesterol, diabetes screen, thyroid check, and Hepatitis C screen will be conducted. He is advised to monitor his blood pressure at home once a week.    2. Sleep apnea risk.  Given his high blood pressure, weight, and snoring, he is at significant risk for sleep apnea. A referral to sleep medicine will be made for a sleep study. Information on sleep hygiene will be provided.    3. Dermatology referral.  A referral to dermatology will be made for further evaluation of his skin.    4. Weight management.  He has lost 20 pounds in the past few months by cutting out sugary sodas, avoiding night snacking, and packing his lunch for work. He is advised to continue with lifestyle modifications for weight loss. The potential benefits of GLP-1 medications were discussed, but he prefers to focus on lifestyle changes for now.    Follow-up  The patient will follow up in 6 weeks.    Diagnoses and all orders for this visit:    1. Morbid obesity with body mass index (BMI) of 45.0 to 49.9 in adult (Primary)    2. Essential hypertension    3. General medical exam  -     Lipid Panel  -     Comprehensive  metabolic panel  -     Hemoglobin A1c  -     TSH Rfx On Abnormal To Free T4  -     Hepatitis C antibody    4. Snoring  -     Ambulatory Referral to Sleep Medicine    5. Atypical mole  -     Ambulatory Referral to Dermatology    Other orders  -     amLODIPine-benazepril (Lotrel) 5-10 MG per capsule; Take 1 capsule by mouth Daily.  Dispense: 90 capsule; Refill: 1             Follow Up   Return in about 6 weeks (around 8/20/2025).  Patient was given instructions and counseling regarding his condition or for health maintenance advice. Please see specific information pulled into the AVS if appropriate.     Patient or patient representative verbalized consent for the use of Ambient Listening during the visit with  Char Garcia MD for chart documentation. 7/9/2025  17:45 EDT

## 2025-07-09 NOTE — PATIENT INSTRUCTIONS
Sleep/fatigue plan:  - CBT-I is the most effective treatment for trouble sleeping according to research. You can try it for free at your own pace using this free adri from the [facility]: https://[employer].TrackVia/adri/cbt-i-#:~:text=CBT%2Di%20[facility]%20is%20based,[facility]%20[facility]%20[facility]%20[facility].  - Keep a consistent sleep schedule. Get up at the same time every day, even on weekends or during vacations.  - Set a bedtime that is early enough for you to get at least 7-8 hours of sleep.  - Don’t go to bed unless you are sleepy.  - If you don’t fall asleep after 20 minutes, get out of bed. Go do a quiet activity without a lot of light exposure. It is especially important to not get on electronics.  - Establish a relaxing bedtime routine.  - Use your bed only for sleep and sex.  - Make your bedroom quiet and relaxing. Keep the room at a comfortable, cool temperature.  - Limit exposure to bright light in the evenings.  - Turn off electronic devices at least 30 minutes before bedtime.  - Don’t eat a large meal before bedtime. If you are hungry at night, eat a light, healthy snack.  - Exercise regularly and maintain a healthy diet.  - Avoid consuming caffeine in the afternoon or evening.  - Avoid consuming alcohol before bedtime.  - Reduce your fluid intake before bedtime.  https://sleepeducation.org/healthy-sleep/healthy-sleep-habits/     Can try supplementing magnesium at night, like magnesium glycinate.

## 2025-07-10 LAB
ALBUMIN SERPL-MCNC: 4.5 G/DL (ref 4.1–5.1)
ALP SERPL-CCNC: 69 IU/L (ref 44–121)
ALT SERPL-CCNC: 41 IU/L (ref 0–44)
AST SERPL-CCNC: 29 IU/L (ref 0–40)
BILIRUB SERPL-MCNC: 0.7 MG/DL (ref 0–1.2)
BUN SERPL-MCNC: 13 MG/DL (ref 6–20)
BUN/CREAT SERPL: 15 (ref 9–20)
CALCIUM SERPL-MCNC: 9.8 MG/DL (ref 8.7–10.2)
CHLORIDE SERPL-SCNC: 103 MMOL/L (ref 96–106)
CHOLEST SERPL-MCNC: 175 MG/DL (ref 100–199)
CO2 SERPL-SCNC: 24 MMOL/L (ref 20–29)
CREAT SERPL-MCNC: 0.89 MG/DL (ref 0.76–1.27)
EGFRCR SERPLBLD CKD-EPI 2021: 113 ML/MIN/1.73
GLOBULIN SER CALC-MCNC: 2.4 G/DL (ref 1.5–4.5)
GLUCOSE SERPL-MCNC: 106 MG/DL (ref 70–99)
HBA1C MFR BLD: 5.6 % (ref 4.8–5.6)
HCV IGG SERPL QL IA: NON REACTIVE
HDLC SERPL-MCNC: 35 MG/DL
LDLC SERPL CALC-MCNC: 121 MG/DL (ref 0–99)
POTASSIUM SERPL-SCNC: 4.2 MMOL/L (ref 3.5–5.2)
PROT SERPL-MCNC: 6.9 G/DL (ref 6–8.5)
SODIUM SERPL-SCNC: 142 MMOL/L (ref 134–144)
TRIGL SERPL-MCNC: 105 MG/DL (ref 0–149)
TSH SERPL DL<=0.005 MIU/L-ACNC: 0.95 UIU/ML (ref 0.45–4.5)
VLDLC SERPL CALC-MCNC: 19 MG/DL (ref 5–40)

## 2025-07-11 ENCOUNTER — PATIENT ROUNDING (BHMG ONLY) (OUTPATIENT)
Dept: FAMILY MEDICINE CLINIC | Facility: CLINIC | Age: 40
End: 2025-07-11
Payer: COMMERCIAL

## 2025-07-11 NOTE — PROGRESS NOTES
Good morning Mr. Goodman,    My name is Julianna, I am a medical assistant here at Dr. Garcia's office. We hope your recent visit with us went smoothly.     If you have any questions or concerns, please feel free to reach out at [phone]. Please take the time to fill out the survey that will be sent to you to help us better your care. Thank you and have a great day!     MADAI Levine

## 2025-08-11 ENCOUNTER — OFFICE VISIT (OUTPATIENT)
Dept: FAMILY MEDICINE CLINIC | Facility: CLINIC | Age: 40
End: 2025-08-11
Payer: COMMERCIAL

## 2025-08-11 VITALS
BODY MASS INDEX: 43.25 KG/M2 | DIASTOLIC BLOOD PRESSURE: 72 MMHG | HEART RATE: 72 BPM | OXYGEN SATURATION: 98 % | TEMPERATURE: 98 F | HEIGHT: 69 IN | WEIGHT: 292 LBS | SYSTOLIC BLOOD PRESSURE: 138 MMHG

## 2025-08-11 DIAGNOSIS — E78.2 MIXED HYPERLIPIDEMIA: ICD-10-CM

## 2025-08-11 DIAGNOSIS — E66.813 CLASS 3 SEVERE OBESITY DUE TO EXCESS CALORIES WITH SERIOUS COMORBIDITY AND BODY MASS INDEX (BMI) OF 40.0 TO 44.9 IN ADULT: ICD-10-CM

## 2025-08-11 DIAGNOSIS — I10 ESSENTIAL HYPERTENSION: Primary | Chronic | ICD-10-CM

## 2025-08-11 PROCEDURE — 99214 OFFICE O/P EST MOD 30 MIN: CPT | Performed by: FAMILY MEDICINE

## 2025-08-12 LAB
BUN SERPL-MCNC: 11 MG/DL (ref 6–20)
BUN/CREAT SERPL: 10.9 (ref 7–25)
CALCIUM SERPL-MCNC: 9.9 MG/DL (ref 8.6–10.5)
CHLORIDE SERPL-SCNC: 104 MMOL/L (ref 98–107)
CO2 SERPL-SCNC: 26.4 MMOL/L (ref 22–29)
CREAT SERPL-MCNC: 1.01 MG/DL (ref 0.76–1.27)
EGFRCR SERPLBLD CKD-EPI 2021: 97.6 ML/MIN/1.73
GLUCOSE SERPL-MCNC: 111 MG/DL (ref 65–99)
POTASSIUM SERPL-SCNC: 5 MMOL/L (ref 3.5–5.2)
SODIUM SERPL-SCNC: 141 MMOL/L (ref 136–145)

## 2025-08-13 ENCOUNTER — RESULTS FOLLOW-UP (OUTPATIENT)
Dept: FAMILY MEDICINE CLINIC | Facility: CLINIC | Age: 40
End: 2025-08-13
Payer: COMMERCIAL